# Patient Record
Sex: MALE | Race: WHITE | NOT HISPANIC OR LATINO | Employment: OTHER | URBAN - METROPOLITAN AREA
[De-identification: names, ages, dates, MRNs, and addresses within clinical notes are randomized per-mention and may not be internally consistent; named-entity substitution may affect disease eponyms.]

---

## 2023-03-21 ENCOUNTER — OFFICE VISIT (OUTPATIENT)
Dept: PODIATRY | Facility: CLINIC | Age: 78
End: 2023-03-21
Payer: MEDICARE

## 2023-03-21 VITALS
DIASTOLIC BLOOD PRESSURE: 75 MMHG | BODY MASS INDEX: 29.8 KG/M2 | HEIGHT: 69 IN | HEART RATE: 92 BPM | WEIGHT: 201.19 LBS | SYSTOLIC BLOOD PRESSURE: 178 MMHG

## 2023-03-21 DIAGNOSIS — B35.3 TINEA PEDIS, UNSPECIFIED LATERALITY: ICD-10-CM

## 2023-03-21 DIAGNOSIS — L60.0 INGROWN NAIL: Primary | ICD-10-CM

## 2023-03-21 DIAGNOSIS — Z86.19 HX OF LYME DISEASE: ICD-10-CM

## 2023-03-21 PROCEDURE — 99999 PR PBB SHADOW E&M-NEW PATIENT-LVL III: CPT | Mod: PBBFAC,,, | Performed by: PODIATRIST

## 2023-03-21 PROCEDURE — 99202 OFFICE O/P NEW SF 15 MIN: CPT | Mod: S$PBB,,, | Performed by: PODIATRIST

## 2023-03-21 PROCEDURE — 99202 PR OFFICE/OUTPT VISIT, NEW, LEVL II, 15-29 MIN: ICD-10-PCS | Mod: S$PBB,,, | Performed by: PODIATRIST

## 2023-03-21 PROCEDURE — 99203 OFFICE O/P NEW LOW 30 MIN: CPT | Mod: PBBFAC,PN | Performed by: PODIATRIST

## 2023-03-21 PROCEDURE — 99999 PR PBB SHADOW E&M-NEW PATIENT-LVL III: ICD-10-PCS | Mod: PBBFAC,,, | Performed by: PODIATRIST

## 2023-03-21 RX ORDER — ALFUZOSIN HYDROCHLORIDE 10 MG/1
TABLET, EXTENDED RELEASE ORAL
COMMUNITY
Start: 2022-08-23

## 2023-03-21 RX ORDER — PREDNISONE 20 MG/1
TABLET ORAL
COMMUNITY
Start: 2023-03-18 | End: 2023-03-30

## 2023-03-21 RX ORDER — LEVOTHYROXINE SODIUM 75 UG/1
TABLET ORAL
COMMUNITY
Start: 2022-08-23

## 2023-03-21 RX ORDER — EVOLOCUMAB 140 MG/ML
INJECTION, SOLUTION SUBCUTANEOUS
COMMUNITY
Start: 2022-07-15

## 2023-03-21 RX ORDER — FLUCONAZOLE 200 MG/1
TABLET ORAL
COMMUNITY
Start: 2023-02-23

## 2023-03-21 RX ORDER — DILTIAZEM HYDROCHLORIDE 60 MG/1
60 TABLET, FILM COATED ORAL
COMMUNITY
Start: 2022-10-05

## 2023-03-21 RX ORDER — DOXYCYCLINE 100 MG/1
CAPSULE ORAL
COMMUNITY
Start: 2022-10-13

## 2023-03-21 RX ORDER — RAMIPRIL 10 MG/1
CAPSULE ORAL
COMMUNITY
Start: 2022-12-14

## 2023-03-23 PROBLEM — Z86.19 HX OF LYME DISEASE: Status: ACTIVE | Noted: 2023-03-23

## 2023-03-23 PROBLEM — B35.3 TINEA PEDIS: Status: ACTIVE | Noted: 2023-03-23

## 2023-03-23 PROBLEM — L60.0 INGROWN NAIL: Status: ACTIVE | Noted: 2023-03-23

## 2023-03-23 NOTE — PROGRESS NOTES
Subjective:       Patient ID: Roberto Welch Jr. is a 77 y.o. male.    Chief Complaint: Nail Problem (bilateral)  Patient presents today for a new patient evaluation he relates that he recently had a joint replacement in his left hand and is having problems with an ingrown toenail but is not able to trim it out himself because of hand surgery.    History reviewed. No pertinent past medical history.  Past Surgical History:   Procedure Laterality Date    HAND SURGERY Left     JOINT REPLACEMENT Left     knee     History reviewed. No pertinent family history.  Social History     Socioeconomic History    Marital status:    Tobacco Use    Smoking status: Never    Smokeless tobacco: Never   Substance and Sexual Activity    Drug use: Never       Current Outpatient Medications   Medication Sig Dispense Refill    alfuzosin (UROXATRAL) 10 mg Tb24   See Instructions, Take 1 tablet by mouth once daily, # 90 EA, 3 Refill(s), Pharmacy: [facility] #[id number], 174, cm, 08/18/22 11:08:00 CDT, Height/Length Measured, 90.6, kg, 08/18/22 11:08:00 CDT, Weight Dosing      diltiaZEM (CARDIZEM) 60 MG tablet 60 mg.      evolocumab (REPATHA SYRINGE) 140 mg/mL Syrg   See Instructions, INJECT 1 ML UNDER THE SKIN EVERY 2 WEEKS. ROTATE INJECTION SITES, # 2 mL, 11 Refill(s), Pharmacy: [facility] #[id number], 174, cm, 06/22/22 11:17:00 CDT, Height/Length Measured, 91.4, kg, 06/22/22 11:17:00 CDT, Weight Dosing      levothyroxine (SYNTHROID) 75 MCG tablet   = 1 tab, Oral, Daily, # 90 tab, 1 Refill(s), Pharmacy: [facility] #[id number], 174, cm, 08/18/22 11:08:00 CDT, Height/Length Measured, 90.6, kg, 08/18/22 11:08:00 CDT, Weight Dosing      predniSONE (DELTASONE) 20 MG tablet   See Instructions, Take 3 tablets by mouth daily for 3 days, then 2 tabs daily for 3 days, then one tablet daily until finished. with food or milk, # 18 tab, 0 Refill(s), Acute, Pharmacy: [facility] #[id number], 175, cm, 03/18/23 13:23:00 CDT,  "He...      ramipriL (ALTACE) 10 MG capsule   = 1 cap, Oral, BID, # 180 cap, 3 Refill(s), Maintenance, Pharmacy: [facility] #[id number], 174, cm, 10/13/22 11:15:00 CDT, Height/Length Measured, 90.7, kg, 10/13/22 11:15:00 CDT, Weight Dosing      doxycycline (VIBRAMYCIN) 100 MG Cap Take by mouth.      fluconazole (DIFLUCAN) 200 MG Tab Take by mouth.       No current facility-administered medications for this visit.     Review of patient's allergies indicates:  No Known Allergies    Review of Systems   Skin:  Positive for color change.   All other systems reviewed and are negative.    Objective:      Vitals:    03/21/23 1452   BP: (!) 178/75   Pulse: 92   Weight: 91.3 kg (201 lb 3.2 oz)   Height: 5' 9" (1.753 m)     Physical Exam  Vitals and nursing note reviewed.   Constitutional:       Appearance: Normal appearance.   Cardiovascular:      Pulses:           Dorsalis pedis pulses are 1+ on the right side and 1+ on the left side.        Posterior tibial pulses are 1+ on the right side and 1+ on the left side.   Pulmonary:      Effort: Pulmonary effort is normal.   Musculoskeletal:         General: Tenderness present.   Feet:      Right foot:      Protective Sensation: 2 sites tested.  2 sites sensed.      Skin integrity: Skin breakdown and erythema present.      Toenail Condition: Right toenails are abnormally thick and ingrown. Fungal disease present.     Left foot:      Protective Sensation: 2 sites tested.  2 sites sensed.      Skin integrity: Skin breakdown and erythema present.      Toenail Condition: Left toenails are abnormally thick and ingrown. Fungal disease present.  Skin:     Capillary Refill: Capillary refill takes more than 3 seconds.      Findings: Erythema and rash present.   Neurological:      General: No focal deficit present.      Mental Status: He is alert.   Psychiatric:         Mood and Affect: Mood normal.         Behavior: Behavior normal.            Assessment:       1. Ingrown nail    2. " Tinea pedis, unspecified laterality    3. Hx of Lyme disease          Plan:       Patient presents today for a new patient evaluation he relates that he recently had a joint replacement in his left hand and is having problems with an ingrown toenail but is not able to trim it out himself because of hand surgery.  Patient relates a significant history of Lyme disease.  A comprehensive new patient evaluation was performed today patient does have signs of fungal involvement interdigitally bilateral I have advised the patient he must dry between his toes well after bathing I applied Betadine interdigitally and advised the patient he also has some ingrowing toenails secondary to fungal infection patient has had a problem trying to trim these because of recent hand surgery I was able to aggressively trim and remove the ingrowing toenails patient did not require a nail avulsion at this time I did recommend treating the fungally infected nails with Vicks vapor rub this needs to be applied twice a day every day and can take 4-6 months to see positive results.  Patient was in understanding and agreement with everything discussed he did notice relief following debridement of the ingrowing nails.This note was created using [employer] voice recognition software that occasionally misinterpreted phrases or words.

## 2025-02-13 ENCOUNTER — OFFICE VISIT (OUTPATIENT)
Dept: PODIATRY | Facility: CLINIC | Age: 80
End: 2025-02-13
Payer: MEDICARE

## 2025-02-13 VITALS
HEART RATE: 58 BPM | WEIGHT: 201.25 LBS | DIASTOLIC BLOOD PRESSURE: 67 MMHG | HEIGHT: 69 IN | SYSTOLIC BLOOD PRESSURE: 159 MMHG | BODY MASS INDEX: 29.81 KG/M2

## 2025-02-13 DIAGNOSIS — M21.6X9 PLANTARFLEXION DEFORMITY OF FOOT: ICD-10-CM

## 2025-02-13 DIAGNOSIS — L60.0 INGROWN NAIL: Primary | ICD-10-CM

## 2025-02-13 PROCEDURE — 99213 OFFICE O/P EST LOW 20 MIN: CPT | Mod: PBBFAC,PN | Performed by: PODIATRIST

## 2025-02-13 RX ORDER — CLONAZEPAM 0.5 MG/1
0.5 TABLET ORAL 2 TIMES DAILY PRN
COMMUNITY

## 2025-02-16 PROBLEM — M21.6X9 PLANTARFLEXION DEFORMITY OF FOOT: Status: ACTIVE | Noted: 2025-02-16

## 2025-02-17 NOTE — PROGRESS NOTES
Subjective:       Patient ID: Roberto Welch Jr. is a 79 y.o. male.    Chief Complaint: Ingrown Toenail  Patient presents today for a follow-up patient evaluation he relates having problems with an ingrown toenail.      History reviewed. No pertinent past medical history.  Past Surgical History:   Procedure Laterality Date    HAND SURGERY Left     JOINT REPLACEMENT Left     knee     No family history on file.  Social History     Socioeconomic History    Marital status:    Tobacco Use    Smoking status: Never    Smokeless tobacco: Never   Substance and Sexual Activity    Drug use: Never       Current Outpatient Medications   Medication Sig Dispense Refill    alfuzosin (UROXATRAL) 10 mg Tb24   See Instructions, Take 1 tablet by mouth once daily, # 90 EA, 3 Refill(s), Pharmacy: [facility] #[id number], 174, cm, 08/18/22 11:08:00 CDT, Height/Length Measured, 90.6, kg, 08/18/22 11:08:00 CDT, Weight Dosing      clonazePAM (KLONOPIN) 0.5 MG tablet Take 0.5 mg by mouth 2 (two) times daily as needed for Anxiety.      evolocumab (REPATHA SYRINGE) 140 mg/mL Syrg   See Instructions, INJECT 1 ML UNDER THE SKIN EVERY 2 WEEKS. ROTATE INJECTION SITES, # 2 mL, 11 Refill(s), Pharmacy: [facility] #[id number], 174, cm, 06/22/22 11:17:00 CDT, Height/Length Measured, 91.4, kg, 06/22/22 11:17:00 CDT, Weight Dosing      levothyroxine (SYNTHROID) 75 MCG tablet   = 1 tab, Oral, Daily, # 90 tab, 1 Refill(s), Pharmacy: [facility] #[id number], 174, cm, 08/18/22 11:08:00 CDT, Height/Length Measured, 90.6, kg, 08/18/22 11:08:00 CDT, Weight Dosing      linaclotide (LINZESS ORAL) Take by mouth.      ramipriL (ALTACE) 10 MG capsule   = 1 cap, Oral, BID, # 180 cap, 3 Refill(s), Maintenance, Pharmacy: [facility] #[id number], 174, cm, 10/13/22 11:15:00 CDT, Height/Length Measured, 90.7, kg, 10/13/22 11:15:00 CDT, Weight Dosing       No current facility-administered medications for this visit.     Review of patient's allergies  "indicates:   Allergen Reactions    Sulfamethoxazole-trimethoprim      Other Reaction(s): It makes pt feel sick       Review of Systems   Musculoskeletal:  Positive for arthralgias.   All other systems reviewed and are negative.      Objective:      Vitals:    02/13/25 1434   BP: (!) 159/67   Pulse: (!) 58   Weight: 91.3 kg (201 lb 4.5 oz)   Height: 5' 9" (1.753 m)     Physical Exam  Vitals and nursing note reviewed.   Constitutional:       Appearance: Normal appearance.   Cardiovascular:      Pulses:           Dorsalis pedis pulses are 1+ on the right side and 1+ on the left side.        Posterior tibial pulses are 1+ on the right side and 1+ on the left side.   Pulmonary:      Effort: Pulmonary effort is normal.   Musculoskeletal:         General: Tenderness present.   Feet:      Right foot:      Protective Sensation: 2 sites tested.  2 sites sensed.      Skin integrity: Skin breakdown and dry skin present.      Toenail Condition: Right toenails are abnormally thick. Fungal disease present.     Left foot:      Protective Sensation: 2 sites tested.  2 sites sensed.      Skin integrity: Skin breakdown, erythema and dry skin present.      Toenail Condition: Left toenails are abnormally thick and ingrown. Fungal disease present.  Skin:     Capillary Refill: Capillary refill takes more than 3 seconds.      Findings: Erythema present.   Neurological:      General: No focal deficit present.      Mental Status: He is alert.   Psychiatric:         Mood and Affect: Mood normal.         Behavior: Behavior normal.                            Assessment:       1. Ingrown nail    2. Plantarflexion deformity of foot          Plan:       Patient presents today with a complaint of an ingrown toenail 5th digit left he states it has been bothering him it has become ingrown enough that it is rubbing the side of the 4th toe left.  On evaluation patient had an ingrowing nail that required debridement and removal of the ingrowing nail at " the medial border of the 5th digit left patient noted immediate relief once this was trimmed.  Patient does have diminished fat pad as appreciated on the plantar forefoot bilateral he has a lot of dry skin callus tissue the left is worse than the right and I have advised the patient he needs to keep his skin well moisturized well hydrated I have recommended the use of a 40% urea cream daily.  Patient is putting himself at risk for callus formation that can become very uncomfortable I have also discussed treatment of the patient's fungally infected nails I have recommended the application of Vicks vapor rub this needs to be applied twice a day every day and can take 4-6 months to start to see positive results.  Patient did indicate he feels that the new shoes that he purchased was the likely cause of the ingrown toenail 5th digit left.  I did dispense the patient some large metatarsal bar pads that I believe will be helpful it offloading pressure from the plantar forefoot where he is obviously getting a lot of pressure he does have plantar flexed metatarsals so he naturally is getting more pressure across the forefoot this in combination with the dry skin is contributing to the callused cracking skin on both feet.  Plan follow-up will be as needed patient advised if for any reason the ingrown toenail starts bothering him again if the 40% urea is not successful at reducing the callus skin and tissue there are other options and we can discuss that as needed.  I did advised the patient in addition to using the Vicks vapor rub twice a day every day I would recommend filing the nails a couple times a week which helps the Vicks vapor rub to penetrate improving the results.  This note was created using [employer] voice recognition software that occasionally misinterpreted phrases or words.